# Patient Record
(demographics unavailable — no encounter records)

---

## 2025-07-14 NOTE — HISTORY OF PRESENT ILLNESS
[Sneakers] : hiwot [FreeTextEntry1] : CARLOS MANUEL is a 70-year-old F present in the [place] office for diabetic foot exam. Patient was diagnosed with diabetes 20 years ago. Patient denies pain, numbness and burning sensation. Patient states she has fungus on her nails. Patient states her nails are discolored.   FBS- 135 A1c- 7.0 April 2025

## 2025-07-14 NOTE — HISTORY OF PRESENT ILLNESS
[Sneakers] : hiwot [FreeTextEntry1] : CARLOS MANUEL is a 70-year-old F present in the [place] office for diabetic foot exam. Patient was diagnosed with diabetes 20 years ago. Patient denies pain, numbness and burning sensation. Patient states she has fungus on her nails. Patient states her nails are discolored.   FBS- 135 A1c- 7.0 April 2025

## 2025-07-14 NOTE — PHYSICAL EXAM
[General Appearance - Alert] : alert [General Appearance - In No Acute Distress] : in no acute distress [1+] : left foot dorsalis pedis 1+ [Vibration Dec.] : diminished vibratory sensation at the level of the toes [Oriented To Time, Place, And Person] : oriented to person, place, and time [Ankle Swelling (On Exam)] : not present [Varicose Veins Of Lower Extremities] : not present [] : not present [de-identified] : good ROM ankle and STJ [FreeTextEntry1] : skin dry and scaly plantar b.l  Nails are clinically mycotic with pain b/l [Diminished Throughout Right Foot] : normal sensation with monofilament testing throughout right foot [Diminished Throughout Left Foot] : normal sensation with monofilament testing throughout left foot

## 2025-07-14 NOTE — ASSESSMENT
[FreeTextEntry1] : Discussed with patient at length the importance of glucose control to limit complications. The patient is advised to follow-up with their primary care physician / endocrinologist for labs and the importance of the HbA1c. Discussed proper shoe gear and foot care.  Monitor for any skin color or temperature changes as directed. They are advised to stretch the calf as directed.  Discussed etiology and treatment options for the painful fungal toenails including proper hygiene/footcare, topical medications and oral antifungals. Continue the antifungal as prescribed. Rx Clotrimazole Cream   Discussed that I could do the foot care for her Statement Selected

## 2025-07-14 NOTE — ASSESSMENT
[FreeTextEntry1] : Discussed with patient at length the importance of glucose control to limit complications. The patient is advised to follow-up with their primary care physician / endocrinologist for labs and the importance of the HbA1c. Discussed proper shoe gear and foot care.  Monitor for any skin color or temperature changes as directed. They are advised to stretch the calf as directed.  Discussed etiology and treatment options for the painful fungal toenails including proper hygiene/footcare, topical medications and oral antifungals. Continue the antifungal as prescribed. Rx Clotrimazole Cream   Discussed that I could do the foot care for her

## 2025-07-14 NOTE — PHYSICAL EXAM
[General Appearance - Alert] : alert [General Appearance - In No Acute Distress] : in no acute distress [1+] : left foot dorsalis pedis 1+ [Vibration Dec.] : diminished vibratory sensation at the level of the toes [Oriented To Time, Place, And Person] : oriented to person, place, and time [Ankle Swelling (On Exam)] : not present [Varicose Veins Of Lower Extremities] : not present [] : not present [de-identified] : good ROM ankle and STJ [FreeTextEntry1] : skin dry and scaly plantar b.l  Nails are clinically mycotic with pain b/l [Diminished Throughout Right Foot] : normal sensation with monofilament testing throughout right foot [Diminished Throughout Left Foot] : normal sensation with monofilament testing throughout left foot

## 2025-07-16 NOTE — HISTORY OF PRESENT ILLNESS
[FreeTextEntry1] : 70 year old F with hx of HTN, GERD  Here for Osteoporosis   Based on recent DEXA scan done in June 2025 she has a T-score of -2.5 in the L-spine, -2.3 in the femoral neck Denies cancers, radiation therapy, heartburn, dental problems or planned dental surgeries  Denies fracture Has upper dentures  Lower teeth without active issues  DEXA 5/2025 RESULTS:  Femoral neck: T-score: -2.3 Z-score:  -0.6 BMD:  0.594 g/cm2  Total hip: T-score: -1.7 ; previously -1.3 Z-score:  -0.2 BMD:  0.735 g/cm2  Spine, L4 excluded: T-score: -2.5; previously -2.7 Z-score: -0.4 BMD: 0.745 g/cm2 IMPRESSION: Osteoporosis.  Labs  6/2025 Normal CBC, CMP  PMHx: As above PSHx: ovary removal, appendectomy, cholecystectomy  Family Hx: Mom had osteoporosis  Social Hx:  Smoking Hx: denies EtOH Hx: social

## 2025-07-16 NOTE — PHYSICAL EXAM
[TextEntry] :   GENERAL: Appears in no acute distress HEENT: EOMI. No conjunctival erythema. Moist mucous membranes. NECK: Supple, no cervical lymphadenopathy CARDIOVASCULAR: RRR. S1, S2 auscultated. PULMONARY: Clear to auscultation b/l No active synovitis, swelling, erythema, or warmth. No joint tenderness to palpation. SKIN: No lesions or rashes NEURO: No focal deficits.  PSYCH:Normal affect and thought process.

## 2025-07-16 NOTE — ASSESSMENT
[FreeTextEntry1] : 70 year old F with hx of HTN, GERD  Here for Osteoporosis   Based on recent DEXA scan done in June 2025 she has a T-score of -2.5 in the L-spine, -2.3 in the femoral neck  Denies cancers, radiation therapy, heartburn, dental problems or planned dental surgeries  Denies fracture Has upper dentures  Lower teeth without active issues  -Discussed DEXA scan results with patient In detail -Discussed indication for treatment based on T-score -  Side effects of bisphosphonates /Fosamaxwere discussed with the pt in detail including bone pain and flu-like illness x 2-3 days, ONJ, atypical femoral fractures Advised to take on empty stomach in morning with full glass of water and to remain upright and NPO for 45mins. - -Advised to take vitamin D and calcium daily  Follow-up 1 year   Total time spent in review of patient history, clinical exam, management, counseling, and plan of care:  47min